# Patient Record
Sex: MALE | Race: WHITE | URBAN - METROPOLITAN AREA
[De-identification: names, ages, dates, MRNs, and addresses within clinical notes are randomized per-mention and may not be internally consistent; named-entity substitution may affect disease eponyms.]

---

## 2019-01-14 ENCOUNTER — EMERGENCY (EMERGENCY)
Facility: HOSPITAL | Age: 24
LOS: 0 days | Discharge: HOME | End: 2019-01-14
Attending: EMERGENCY MEDICINE | Admitting: EMERGENCY MEDICINE

## 2019-01-14 VITALS
OXYGEN SATURATION: 96 % | RESPIRATION RATE: 18 BRPM | TEMPERATURE: 97 F | SYSTOLIC BLOOD PRESSURE: 132 MMHG | DIASTOLIC BLOOD PRESSURE: 58 MMHG | HEART RATE: 64 BPM

## 2019-01-14 VITALS — WEIGHT: 164.91 LBS | HEIGHT: 70 IN

## 2019-01-14 DIAGNOSIS — F32.9 MAJOR DEPRESSIVE DISORDER, SINGLE EPISODE, UNSPECIFIED: ICD-10-CM

## 2019-01-14 DIAGNOSIS — F17.200 NICOTINE DEPENDENCE, UNSPECIFIED, UNCOMPLICATED: ICD-10-CM

## 2019-01-14 DIAGNOSIS — F39 UNSPECIFIED MOOD [AFFECTIVE] DISORDER: ICD-10-CM

## 2019-01-14 NOTE — ED BEHAVIORAL HEALTH ASSESSMENT NOTE - OTHER PAST PSYCHIATRIC HISTORY (INCLUDE DETAILS REGARDING ONSET, COURSE OF ILLNESS, INPATIENT/OUTPATIENT TREATMENT)
pt reports he began to feel that rooms were "bugged" at age 18, and family noticed at age 19. He reports being dx with schizophrenia at that time. He says he was treated with high dose Seroquel. He reports Seroquel was titrated down to 200mg PO last October. He reports unable to look in mirror or look people in eye due to poor self esteem. Denies symptoms of psychosis.

## 2019-01-14 NOTE — ED BEHAVIORAL HEALTH ASSESSMENT NOTE - HPI (INCLUDE ILLNESS QUALITY, SEVERITY, DURATION, TIMING, CONTEXT, MODIFYING FACTORS, ASSOCIATED SIGNS AND SYMPTOMS)
22 yo male, privately domiciled with family, employed at [employer], previous psych dx of schizophrenia, no known IPP admits, SA, or significant substance use, presenting to ED as per therapist referral 2/2 to suicidal ideation. Pt reports he has been feeling depressed for past few months, worsening within past few days. He endorses depressed mood, decreased appetite with weight loss, insomnia (going to bed at 4am), self isolation, crying spells, and low self esteem, resulting him to seek out therapist he has not seen in many years. He reports when he saw therapist today, he was told to come to ED. Pt reports he has been having thoughts of taking a bottle of pills, approx. one thought per day. He reports he has been having these thoughts "for a while, but they are getting worse." Pt 22 yo male, privately domiciled with family, employed at [employer], previous psych dx of schizophrenia, no known IPP admits, SA, or significant substance use, presenting to ED as per therapist referral 2/2 to suicidal ideation. Pt reports he has been feeling depressed for past few months, worsening within past few days. He endorses depressed mood, decreased appetite with weight loss, insomnia (going to bed at 4am), self isolation, crying spells, and low self esteem, resulting him to seek out therapist he has not seen in many years. He reports when he saw therapist today, he was told to come to ED. Pt reports he has been having thoughts of taking a bottle of pills, approx. one thought per day. He reports he has been having these thoughts "for a while, but they are getting worse." Pt denies suicidal intent or specific plan. He reports being able to distract himself with TV. He says therapist gave him number for OP psychiatrist and he plans to f/u. He reports recent MJ use a few days ago, with prior use a few months ago, denies other substance use.   Pt denies AH/VH, grandiose or paranoid delusions. He denies referential ideations. He endorses periods of his life where he had a lot of energy, was told he spoke fast, made impulsive decisions he later regretted, and felt he could accomplish anything he set his mind to. Pt denied homicidal ideations, intent, or plan.  Sister at bedside denies acute safety concerns. Safety plan made with pt and sister to come back to ED should symptoms worsen.

## 2019-01-14 NOTE — ED PROVIDER NOTE - OBJECTIVE STATEMENT
23y M w [facility] depression presents at the recommendation of his therapist. Pt had not seen a therapist in a long time, but had worsening depression at home, so reached out to an old therapist. Pt has had progressively worsening depressed thoughts at home and has started to have thoughts of suicide. No specific plan, but has thought about taking pills.

## 2019-01-14 NOTE — ED BEHAVIORAL HEALTH ASSESSMENT NOTE - RISK ASSESSMENT
pt's mood episode and suicidal ideation are risk factors which can be mitigated by willingness to come to ED, therapeutic alliance, help seeking behaviour, lack of history of suicidality, and supportive network.

## 2019-01-14 NOTE — ED BEHAVIORAL HEALTH ASSESSMENT NOTE - SUMMARY
22 yo male, privately domiciled with family, employed at [employer], previous psych dx of schizophrenia, no known IPP admits, SA, or significant substance use, presenting to ED as per therapist referral 2/2 to suicidal ideation. Upon exam, pt presents with depressed mood and affect, with linear thought process, without perceptual disturbances. Although pt reports suicidal ideation with non specific plan of taking pills, he denies intent and was able to engage with safety planning. Pt reports he will f/u with OP psychiatry within five days. Family at bedside denies acute safety concerns. At this time, pt is not considered acute danger to self or others and does not meet criteria for emergency IPP admission.

## 2019-01-14 NOTE — ED PROVIDER NOTE - ATTENDING CONTRIBUTION TO CARE
23 y.o. male PMH of schizophrenia, sent by therapist for evaluation of worsening depression and SI. No HI/VH/AH. No HA. NO CP/SOB. NO abdominal pain. No other complains. On exam, pt in NAD, AAOx3, head NC/AT, CN II-XII intact, lungs CTA B/L, CV S1S2 regular, abdomen soft/NT/ND/(+)BS, ext (-) edema, ambulating with steady gait. Will get psyc consult and reevaluate.

## 2019-01-14 NOTE — ED PROVIDER NOTE - NS ED ROS FT
Constitutional: No fever or chills. Normal appetite. No unintended weight loss.   Eyes: No vision changes.  ENT: No hearing changes. No ear pain. No sore throat.  Neck: No neck pain or stiffness.  Cardiovascular: No chest pain, palpitations, or edema.  Pulmonary: No cough or SOB. No hemoptysis.  Abdominal: No abdominal pain, nausea, vomiting, or diarrhea.   : No dysuria or frequency. No hematuria.   Neuro: No headache, syncope, or dizziness.  MS: No back pain. No calf pain/swelling.  Psych: No homicidal ideations.

## 2019-01-14 NOTE — ED BEHAVIORAL HEALTH ASSESSMENT NOTE - SAFETY PLAN DETAILS
If symptoms worsen let sister know, come to nearest ED, call 911, 1 [phone] [facility], [phone] [facility]

## 2019-01-14 NOTE — ED BEHAVIORAL HEALTH ASSESSMENT NOTE - REFERRAL / APPOINTMENT DETAILS
[facility] [address] [id number] ()- pt to f/u within five days

## 2019-01-14 NOTE — ED PROVIDER NOTE - NSFOLLOWUPCLINICS_GEN_ALL_ED_FT
[facility]  OP [facility]  [address] [place] [id number]  Phone: [phone]  Fax:   Follow Up Time:

## 2019-01-14 NOTE — ED PROVIDER NOTE - NSFOLLOWUPINSTRUCTIONS_ED_ALL_ED_FT
Patient to be discharged from ED. Any available test results were discussed with patient and/or family. Verbal instructions given, including instructions to return to ED immediately for any new, worsening, or concerning symptoms. Patient endorsed understanding. Written discharge instructions additionally given, including follow-up plan.    Depression    Depression is a mental illness that usually causes feelings of sadness, hopelessness, or helplessness. Some people with this disorder do not feel particularly sad but lose interest in doing things they used to enjoy. Major depressive disorder also can cause physical symptoms. It can interfere with work, school, relationships, and other normal everyday activities. If you were started on a medication, make sure to take exactly as prescribed and follow up with a psychiatrist.    SEEK IMMEDIATE MEDICAL CARE IF YOU HAVE ANY OF THE FOLLOWING SYMPTOMS: thoughts about hurting or killing yourself, thoughts about hurting or killing somebody else, hallucinations, or worsening depression.

## 2019-01-14 NOTE — ED BEHAVIORAL HEALTH ASSESSMENT NOTE - DESCRIPTION
Pt examined in private space. Calm and cooperative with interview. Did not appear internally preoccupied or disorganized. stomach ulcers lives with parents and sister, works at [employer], was in [employer] for 1 year

## 2019-01-14 NOTE — ED PROVIDER NOTE - MEDICAL DECISION MAKING DETAILS
Pt with worsening depression and SI. Evaluated by psyc, cleared for discharge with follow up. Pt lives with parents and sister. Pt and family comfortable with plan. Advised to return for worsening of symptoms.

## 2019-01-14 NOTE — ED PROVIDER NOTE - PHYSICAL EXAMINATION
Constitutional: Well developed, well nourished. NAD. Good general hygiene  Head: Atraumatic.  Eyes: EOMI without discomfort.   ENT: No nasal discharge. Mucous membranes moist.  Cardiovascular: Regular rhythm. Regular rate. Normal S1 and S2.  Pulmonary: Normal respiratory rate and effort.   Extremities: Ambulatory.   Skin: No rashes.   Neuro: AAOx3. No focal neurological deficits.  Psych: Normal mood. Normal affect.

## 2023-01-30 NOTE — ED ADULT NURSE NOTE - CHPI ED NUR TIMING2
5-Fu Pregnancy And Lactation Text: This medication is Pregnancy Category X and contraindicated in pregnancy and in women who may become pregnant. It is unknown if this medication is excreted in breast milk. worsening

## 2025-05-21 NOTE — ED BEHAVIORAL HEALTH ASSESSMENT NOTE - NS ED BHA REVIEW OF ED CHART AVAILABLE INVESTIGATIONS REVIEWED
36 year old male with PMH of morbid obesity, BEA, pAFIB (not on AC), HTN, and BL papilledema attributed to pseudotumor cerebri; who initially presented to  on 2/24 with SOB and BL LE edema. Found to have URI with progressive SOB and multifocal PNA on imaging. His hospital course was complicated by worsening respiratory distress and increased 02 demands secondary to secretions (requiring multiple intubation attempts) and eventual MICU admission. While in MICU, he 02 requirements continued despite optimal vent management. Concern noted for progressive ARDS, unable to prone given morbid obesity, and ultimately cannulated for vvECMO on 2/25 and transferred to [facility] for further management on 2/26. His hospital course at [facility] was significant for the following: diuretic and ABX management, s/p trach and PEG (placed on 3/7- PEG since removed), RCU admission, high grade fevers (secondary to panniculitis), progressively worsening sacral ulcer (likely osteomyelitis- s/p surgical debridement), BL foot wound (secondary to pressor use), neuropathy (secondary to critical illness polyneuropathy), ELLA (secondary to vancomycin toxicity and overdiuresis- since DCd- with improvement). Patient now admitted for a multidisciplinary rehab program. 04-25-25     # left hip pain - Improving  - hip xray--> no fracture  - CT hip: Again seen is a sacral decubitus wound which extends superficial as well as involving the right gluteus musculature and the posterior L5 with small amount of fluid and gas within the tract. Limited involvement on this noncontrast CT. This likely corresponds to adjacent phlegmonous changes  - MRI left hip: Incompletely characterized sacral wound with associated ill-defined collection of fluid and soft tissue gas which extends to/involve the right gluteus zurdo muscle as at CT pelvis study from one day prior. No interval change in the high STIR signal within the bilateral gluteal and thigh musculature suggestive of a myositis as compared to prior MRI study. No fracture, osseous destruction or aggressive periosteal reaction. No osteomyelitis at the left hip. Edema-like signal along the right and left greater trochanteric regions, overall increased as compared to the prior MRI study from 4/11/2025. Findings are felt to be reactive in etiology..  - pain control per rehab team   - pt/ot    # Abnormal CT A/P:   - 5/2: CT A/P: Question of pneumatosis in the cecum with no other evidence of bowel ischemia. Correlation with lactate levels and clinical exam would be helpful.  - Patient has no symptoms. Abdomen is benign. Tolerating PO  - 5/2: Surgery cx noted: no intervention. c/w PO. refer to bariatric surgery post-DC dr botello at [place] or Dr. Segovia at [place] [per patient request]  - 5/3 GI cx noted: no GI intervention planned. c/w PO    #Stage IV Sacral Decub Ulcer  #Bilateral Buttocks Wounds  #Bilateral Foot Wounds  -5/2 CT A/P: An air and fluid containing collection in the right gluteus zurdo muscle in continuity with a subcutaneous sacral collection. No obvious skin defect to suggest a decubitus ulcer.  -5/7: MRI left hip: Incompletely characterized sacral wound with associated ill-defined collection of fluid and soft tissue gas which extends to/involve the right gluteus zurdo muscle as at CT pelvis study from one day prior. No interval change in the high STIR signal within the bilateral gluteal and thigh musculature suggestive of a myositis as compared to prior MRI study. No fracture, osseous destruction or aggressive periosteal reaction. No osteomyelitis at the left hip. Edema-like signal along the right and left greater trochanteric regions, overall increased as compared to the prior MRI study from 4/11/2025. Findings are felt to be reactive in etiology..  -Continue local wound care  -MVI, vitamin C and zinc   -Off load pressure  -Plastic consult following  -ID follow up noted and appreciated  -Per plastics - continue current management    #Fungemia 2/2 Panniculitis  -Blood culture 3/15 and 3/16 with candida albicans  -s/p course of antifungals   -Repeat cultures negative since 3/18    #Debility Secondary to Acute Hypoxic Respiratory Failure/ARDS 2/2 PNA  #BEA (Not on CPAP)  #Critical Illness Polyneuropathy   -s/p VV ECMO, s/p diuresis, TTE/ROBERT with RV failure, s/p treatment for pneumonia  -Repeat Echo 3/11 showed EF 66 %. RV is not well visualized, enlarged RV cavity size a/ reduced RV systolic function. No significant valvular disease.  No echocardiographic evidence of pulmonary hypertension.  -s/p Trach (decannulated 3/28) and PEG (removed 4/15)   -Saturating well on RA  -Continue duoneb PRN   -BIPAP QHS (FiO2 40%, 18/10) via nasal mask   -Fall precaution  -Pain control per rehab team   -Outpatient pulm follow up     #Chronic AFIB  #Sinus Tachycardia  - Patient has intermittent tachycardia. Per patient, His HR  mostly above 110 even when he is not sick. follows with cardio OP. not interested in rate control meds at this time.   - 5/3: EKG: NSR@94 bpm  - c/w Eliquis   - TSH WNL 2/25  - Sinus tachycardia likely multifactorial including pain and deconditioning. Low suspicion for PE, no SOB/hypoxia and he is already on full AC    #RIJ and Bilateral LE DVT  -Duplex RUE 3/14 showed Acute, non-occlusive deep vein thrombosis noted within the right internal jugular vein. Superficial vein thrombosis noted within the right antecubital cephalic vein.  -Duplex LE 3/14 showed Acute, occlusive deep vein thromboses noted within the right and left peroneal veins at both mid calves. Age-indeterminate, occlusive deep vein thrombosis visualized within the left gastrocnemius vein at the proximal calf.  -Continue eliquis  -Repeat duplex 4/29 showed No evidence of deep venous thrombosis in either lower extremity.    #Normocytic Anemia   -Likely multifactorial  -H/H stable, no evidence of active bleed   -Monitor CBC     #HTN  -Continue Amlodipine w/ holding parameters   -Monitor BP     #History of Pseudotumor Cerebri   -Outpatient follow up     #ELLA (Improved)  -Baseline Cr appears to be ~0.8 range   -ELLA felt to be multifactorial in setting of cardiorenal w/ RVE, recurrent ELLA suspected due to vancomycin toxicity and diuresis   -Continue to encourage oral hydration   -Continue Renvela   -Low phos diet  -Monitor BMP and phos level     #Type 2 Diabetes  -HbA1C 6.7 on 2/25, Repeat HbA1C 4.9 on 4/28  -FS trend reviewed, has been within goal  -Monitor glucose on routine lab, controlled     #Urinary Retention  -Flomax 5/1  -Renal u/s 5/20 unremarkable    #Mood  -Continue seroquel     #DVT PPx - Eliquis    Discussed with rehab team  None available